# Patient Record
Sex: MALE | Race: WHITE | NOT HISPANIC OR LATINO | URBAN - METROPOLITAN AREA
[De-identification: names, ages, dates, MRNs, and addresses within clinical notes are randomized per-mention and may not be internally consistent; named-entity substitution may affect disease eponyms.]

---

## 2017-01-30 ENCOUNTER — OFFICE (OUTPATIENT)
Dept: URBAN - METROPOLITAN AREA CLINIC 11 | Facility: CLINIC | Age: 52
End: 2017-01-30

## 2017-01-30 VITALS
SYSTOLIC BLOOD PRESSURE: 133 MMHG | HEART RATE: 98 BPM | DIASTOLIC BLOOD PRESSURE: 84 MMHG | HEIGHT: 74 IN | WEIGHT: 225 LBS

## 2017-01-30 DIAGNOSIS — K62.5 HEMORRHAGE OF ANUS AND RECTUM: ICD-10-CM

## 2017-01-30 DIAGNOSIS — K52.89 OTHER SPECIFIED NONINFECTIVE GASTROENTERITIS AND COLITIS: ICD-10-CM

## 2017-01-30 DIAGNOSIS — Z80.0 FAMILY HISTORY OF MALIGNANT NEOPLASM OF DIGESTIVE ORGANS: ICD-10-CM

## 2017-01-30 PROCEDURE — 99203 OFFICE O/P NEW LOW 30 MIN: CPT | Performed by: INTERNAL MEDICINE

## 2017-01-30 RX ORDER — SODIUM PICOSULFATE, MAGNESIUM OXIDE, AND ANHYDROUS CITRIC ACID 10; 3.5; 12 MG/16.1G; G/16.1G; G/16.1G
POWDER, METERED ORAL
Qty: 1 | Refills: 0 | Status: COMPLETED
Start: 2017-01-30 | End: 2017-02-02

## 2017-02-02 ENCOUNTER — OFFICE (OUTPATIENT)
Dept: URBAN - METROPOLITAN AREA CLINIC 11 | Facility: CLINIC | Age: 52
End: 2017-02-02

## 2017-02-02 ENCOUNTER — AMBULATORY SURGICAL CENTER (OUTPATIENT)
Dept: URBAN - METROPOLITAN AREA SURGERY 3 | Facility: SURGERY | Age: 52
End: 2017-02-02

## 2017-02-02 VITALS
RESPIRATION RATE: 17 BRPM | RESPIRATION RATE: 19 BRPM | SYSTOLIC BLOOD PRESSURE: 138 MMHG | SYSTOLIC BLOOD PRESSURE: 125 MMHG | WEIGHT: 215 LBS | HEART RATE: 80 BPM | TEMPERATURE: 97.3 F | SYSTOLIC BLOOD PRESSURE: 126 MMHG | TEMPERATURE: 97.3 F | SYSTOLIC BLOOD PRESSURE: 122 MMHG | HEART RATE: 77 BPM | DIASTOLIC BLOOD PRESSURE: 74 MMHG | DIASTOLIC BLOOD PRESSURE: 82 MMHG | SYSTOLIC BLOOD PRESSURE: 111 MMHG | TEMPERATURE: 97.5 F | HEART RATE: 77 BPM | DIASTOLIC BLOOD PRESSURE: 74 MMHG | RESPIRATION RATE: 18 BRPM | SYSTOLIC BLOOD PRESSURE: 126 MMHG | SYSTOLIC BLOOD PRESSURE: 122 MMHG | OXYGEN SATURATION: 96 % | RESPIRATION RATE: 17 BRPM | RESPIRATION RATE: 20 BRPM | OXYGEN SATURATION: 98 % | HEART RATE: 72 BPM | HEART RATE: 80 BPM | SYSTOLIC BLOOD PRESSURE: 138 MMHG | OXYGEN SATURATION: 100 % | HEART RATE: 72 BPM | HEART RATE: 73 BPM | OXYGEN SATURATION: 96 % | DIASTOLIC BLOOD PRESSURE: 82 MMHG | RESPIRATION RATE: 18 BRPM | DIASTOLIC BLOOD PRESSURE: 75 MMHG | OXYGEN SATURATION: 98 % | HEART RATE: 85 BPM | DIASTOLIC BLOOD PRESSURE: 83 MMHG | HEART RATE: 85 BPM | HEIGHT: 74 IN | RESPIRATION RATE: 19 BRPM | OXYGEN SATURATION: 99 % | RESPIRATION RATE: 20 BRPM | SYSTOLIC BLOOD PRESSURE: 125 MMHG | TEMPERATURE: 97.5 F | DIASTOLIC BLOOD PRESSURE: 83 MMHG | DIASTOLIC BLOOD PRESSURE: 75 MMHG | OXYGEN SATURATION: 100 % | SYSTOLIC BLOOD PRESSURE: 111 MMHG | HEART RATE: 73 BPM | HEIGHT: 74 IN | WEIGHT: 215 LBS | OXYGEN SATURATION: 99 %

## 2017-02-02 DIAGNOSIS — R19.7 DIARRHEA, UNSPECIFIED: ICD-10-CM

## 2017-02-02 DIAGNOSIS — K63.89 OTHER SPECIFIED DISEASES OF INTESTINE: ICD-10-CM

## 2017-02-02 DIAGNOSIS — K57.30 DIVERTICULOSIS OF LARGE INTESTINE WITHOUT PERFORATION OR ABS: ICD-10-CM

## 2017-02-02 DIAGNOSIS — K62.5 HEMORRHAGE OF ANUS AND RECTUM: ICD-10-CM

## 2017-02-02 PROCEDURE — 45380 COLONOSCOPY AND BIOPSY: CPT | Performed by: INTERNAL MEDICINE

## 2017-02-02 PROCEDURE — 88305 TISSUE EXAM BY PATHOLOGIST: CPT | Performed by: INTERNAL MEDICINE

## 2017-02-02 NOTE — SERVICEHPINOTES
51-year-old white male known to me from a prior screening colonoscopy 3-1/2 years ago that revealed only mild sigmoid diverticulosis. Since that time. He's been diagnosed with severe problems related to fibromyalgia and essential tremors. He has bladder dysfunction based on the tremor issue. He's also developed problems with diarrhea for the last 2 years. 3-4 bowel movements a day, loose, mostly but not all the time. Over the last 6 months has developed lower abdominal cramping pain associated with a bowel movement and relieved by bowel movement. There's been some rectal bleeding with blood on the toilet tissue but none in the stool, of which he is aware of. He describes a significant weight gain and not weight loss. The patient blames this on his rather extensive list of medications. His father  of colon cancer at age 49.